# Patient Record
Sex: MALE | Race: WHITE | NOT HISPANIC OR LATINO | Employment: OTHER | URBAN - NONMETROPOLITAN AREA
[De-identification: names, ages, dates, MRNs, and addresses within clinical notes are randomized per-mention and may not be internally consistent; named-entity substitution may affect disease eponyms.]

---

## 2023-11-29 ENCOUNTER — OFFICE VISIT (OUTPATIENT)
Dept: ENDOCRINOLOGY | Facility: CLINIC | Age: 73
End: 2023-11-29
Payer: OTHER GOVERNMENT

## 2023-11-29 VITALS
WEIGHT: 199.2 LBS | HEART RATE: 78 BPM | SYSTOLIC BLOOD PRESSURE: 146 MMHG | DIASTOLIC BLOOD PRESSURE: 82 MMHG | OXYGEN SATURATION: 95 % | BODY MASS INDEX: 28.52 KG/M2 | HEIGHT: 70 IN

## 2023-11-29 DIAGNOSIS — D35.2 PITUITARY ADENOMA: Primary | ICD-10-CM

## 2023-11-29 PROCEDURE — 84146 ASSAY OF PROLACTIN: CPT | Performed by: NURSE PRACTITIONER

## 2023-11-29 RX ORDER — BACLOFEN 10 MG/1
10 TABLET ORAL 3 TIMES DAILY
COMMUNITY

## 2023-11-29 RX ORDER — ATORVASTATIN CALCIUM 80 MG/1
80 TABLET, FILM COATED ORAL DAILY
COMMUNITY

## 2023-11-29 RX ORDER — PANTOPRAZOLE SODIUM 40 MG/1
40 TABLET, DELAYED RELEASE ORAL DAILY
COMMUNITY

## 2023-11-29 RX ORDER — LORAZEPAM 0.5 MG/1
0.5 TABLET ORAL EVERY 8 HOURS PRN
COMMUNITY

## 2023-11-29 RX ORDER — DONEPEZIL HYDROCHLORIDE 10 MG/1
10 TABLET, FILM COATED ORAL NIGHTLY
COMMUNITY

## 2023-11-29 RX ORDER — SPIRONOLACTONE 25 MG/1
12.5 TABLET ORAL DAILY
COMMUNITY

## 2023-11-29 RX ORDER — HYDROCODONE BITARTRATE AND ACETAMINOPHEN 10; 325 MG/1; MG/1
1 TABLET ORAL EVERY 6 HOURS PRN
COMMUNITY

## 2023-11-29 RX ORDER — SOLIFENACIN SUCCINATE 10 MG/1
10 TABLET, FILM COATED ORAL DAILY
COMMUNITY

## 2023-11-29 RX ORDER — DIPHENHYDRAMINE HCL 25 MG
25 CAPSULE ORAL EVERY 6 HOURS PRN
COMMUNITY

## 2023-11-29 RX ORDER — LANOLIN ALCOHOL/MO/W.PET/CERES
1000 CREAM (GRAM) TOPICAL DAILY
COMMUNITY

## 2023-11-29 RX ORDER — SACUBITRIL AND VALSARTAN 49; 51 MG/1; MG/1
1 TABLET, FILM COATED ORAL 2 TIMES DAILY
COMMUNITY

## 2023-11-29 RX ORDER — CABERGOLINE 0.5 MG/1
0.25 TABLET ORAL 2 TIMES WEEKLY
COMMUNITY

## 2023-11-29 RX ORDER — QUETIAPINE 150 MG/1
150 TABLET, FILM COATED, EXTENDED RELEASE ORAL NIGHTLY
COMMUNITY

## 2023-11-29 RX ORDER — SENNOSIDES 8.6 MG
650 CAPSULE ORAL EVERY 8 HOURS PRN
COMMUNITY

## 2023-11-29 RX ORDER — WARFARIN SODIUM 2.5 MG/1
2.5 TABLET ORAL
COMMUNITY

## 2023-11-29 NOTE — PROGRESS NOTES
Chief Complaint   Patient presents with    Pituitary Problem        Referring Provider  No ref. provider found     HPI   Boni Rich is a 73 y.o. male had concerns including Pituitary Problem.    Seen as a new patient.  Hyperprolactinemia, Pituitary Adenoma.       He has a pituitary adenoma, takes Cabergoline 0.25 mg bi-weekly.  Last MRI was in 2019 at [facility].  Was dx'd in 2014 by PCP with abnormal labs and MRI. His most recent MRI was 06/2020 and was noted to be [id number] mm. He was seeing Dr Jacobo in [place], [place], who has now [facility] and [facility] is [facility] his [facility] here.  He also has dementia, recently started Aricept and has noted improvements in his health since then.  He is waiting to go to [facility] for dementia treatment.    No past medical history on file.  No past surgical history on file.   No family history on file.   Social History     Socioeconomic History    Marital status:    Tobacco Use    Smoking status: Never     Passive exposure: Never    Smokeless tobacco: Never   Substance and Sexual Activity    Alcohol use: Never    Drug use: Never    Sexual activity: Defer      Allergies   Allergen Reactions    Ambien [Zolpidem] Hallucinations      Current Outpatient Medications on File Prior to Visit   Medication Sig Dispense Refill    acetaminophen (TYLENOL) 650 MG 8 hr tablet Take 1 tablet by mouth Every 8 (Eight) Hours As Needed for Mild Pain.      atorvastatin (LIPITOR) 80 MG tablet Take 1 tablet by mouth Daily.      baclofen (LIORESAL) 10 MG tablet Take 1 tablet by mouth 3 (Three) Times a Day.      cabergoline (DOSTINEX) 0.5 MG tablet Take 0.5 tablets by mouth 2 (Two) Times a Week.      diphenhydrAMINE (BENADRYL) 25 mg capsule Take 1 capsule by mouth Every 6 (Six) Hours As Needed for Itching.      donepezil (ARICEPT) 10 MG tablet Take 1 tablet by mouth Every Night.      HYDROcodone-acetaminophen (NORCO)  MG per tablet Take 1 tablet by mouth Every 6 (Six)  "Hours As Needed for Moderate Pain.      LORazepam (ATIVAN) 0.5 MG tablet Take 1 tablet by mouth Every 8 (Eight) Hours As Needed for Anxiety.      pantoprazole (PROTONIX) 40 MG EC tablet Take 1 tablet by mouth Daily.      QUEtiapine XR (SEROquel XR) 150 MG 24 hr tablet Take 1 tablet by mouth Every Night.      sacubitril-valsartan (Entresto) 49-51 MG tablet Take 1 tablet by mouth 2 (Two) Times a Day.      solifenacin (VESICARE) 10 MG tablet Take 1 tablet by mouth Daily.      spironolactone (ALDACTONE) 25 MG tablet Take 0.5 tablets by mouth Daily.      vitamin B-12 (CYANOCOBALAMIN) 1000 MCG tablet Take 1 tablet by mouth Daily.      warfarin (COUMADIN) 2.5 MG tablet Take 1 tablet by mouth Daily.       No current facility-administered medications on file prior to visit.        The following portions of the patient's history were reviewed and updated as appropriate: allergies, current medications, past family history, past medical history, past social history, past surgical history, and problem list.    Review of Systems   Constitutional: Negative.    Eyes: Negative.    Genitourinary:  Positive for breast discharge.   Skin: Negative.    Neurological:  Positive for headache and memory problem.   Psychiatric/Behavioral: Negative.     All other systems reviewed and are negative.    /82 (BP Location: Left arm, Patient Position: Sitting, Cuff Size: Adult)   Pulse 78   Ht 177.8 cm (70\")   Wt 90.4 kg (199 lb 3.2 oz)   SpO2 95%   BMI 28.58 kg/m²      Physical Exam  Vitals reviewed.   Constitutional:       Appearance: Normal appearance.   Eyes:      Extraocular Movements: Extraocular movements intact.   Cardiovascular:      Rate and Rhythm: Normal rate.   Pulmonary:      Effort: Pulmonary effort is normal.   Skin:     General: Skin is warm.   Neurological:      General: No focal deficit present.      Mental Status: He is alert and oriented to person, place, and time.   Psychiatric:         Mood and Affect: Mood normal.  " "       Behavior: Behavior normal.         Thought Content: Thought content normal.         Judgment: Judgment normal.       Labs/Imaging  CMP:     Lipid Panel:  No results found for: \"CHOL\", \"TRIG\", \"HDL\", \"VLDL\", \"LDL\"  HbA1c:     Glucose:  No results found for: \"POCGLU\"  TSH:  No results found for: \"TSH\"    Assessment and Plan    Diagnoses and all orders for this visit:    1. Pituitary adenoma (Primary)  Assessment & Plan:  Will obtain labs to determine if current dose of Cabergoline is adequate.  Will order update MRI to monitor for changes to the size of noted mass.  Will follow-up in 1 year, or sooner if indicated.    Orders:  -     Prolactin  -     MRI Brain With & Without Contrast         Return in about 1 year (around 11/29/2024) for Follow-up appointment. The patient was instructed to contact the clinic with any interval questions or concerns.      This document has been electronically signed by EUGENE Castillo  November 29, 2023 16:40 EST   Endocrinology    Please note that portions of this document were completed with a voice recognition program. Efforts were made to edit the dictations, but occasionally words are mis-transcribed.   "

## 2023-11-29 NOTE — ASSESSMENT & PLAN NOTE
Will obtain labs to determine if current dose of Cabergoline is adequate.  Will order update MRI to monitor for changes to the size of noted mass.  Will follow-up in 1 year, or sooner if indicated.

## 2023-11-30 LAB — PROLACTIN SERPL-MCNC: 0.99 NG/ML (ref 4.04–15.2)

## 2024-12-12 ENCOUNTER — OFFICE VISIT (OUTPATIENT)
Dept: ENDOCRINOLOGY | Facility: CLINIC | Age: 74
End: 2024-12-12
Payer: OTHER GOVERNMENT

## 2024-12-12 VITALS
DIASTOLIC BLOOD PRESSURE: 80 MMHG | SYSTOLIC BLOOD PRESSURE: 130 MMHG | HEART RATE: 92 BPM | OXYGEN SATURATION: 97 % | WEIGHT: 203 LBS | BODY MASS INDEX: 29.13 KG/M2

## 2024-12-12 DIAGNOSIS — D35.2 PITUITARY ADENOMA: ICD-10-CM

## 2024-12-12 DIAGNOSIS — R30.0 DYSURIA: Primary | ICD-10-CM

## 2024-12-12 LAB
BACTERIA UR QL AUTO: ABNORMAL /HPF
BASOPHILS # BLD AUTO: 0.04 10*3/MM3 (ref 0–0.2)
BASOPHILS NFR BLD AUTO: 0.5 % (ref 0–1.5)
BILIRUB UR QL STRIP: NEGATIVE
CLARITY UR: ABNORMAL
COLOR UR: ABNORMAL
DEPRECATED RDW RBC AUTO: 49.7 FL (ref 37–54)
EOSINOPHIL # BLD AUTO: 0.11 10*3/MM3 (ref 0–0.4)
EOSINOPHIL NFR BLD AUTO: 1.4 % (ref 0.3–6.2)
ERYTHROCYTE [DISTWIDTH] IN BLOOD BY AUTOMATED COUNT: 15.9 % (ref 12.3–15.4)
GLUCOSE UR STRIP-MCNC: NEGATIVE MG/DL
HCT VFR BLD AUTO: 34.5 % (ref 37.5–51)
HGB BLD-MCNC: 10.5 G/DL (ref 13–17.7)
HGB UR QL STRIP.AUTO: ABNORMAL
HYALINE CASTS UR QL AUTO: ABNORMAL /LPF
IMM GRANULOCYTES # BLD AUTO: 0.02 10*3/MM3 (ref 0–0.05)
IMM GRANULOCYTES NFR BLD AUTO: 0.3 % (ref 0–0.5)
KETONES UR QL STRIP: NEGATIVE
LEUKOCYTE ESTERASE UR QL STRIP.AUTO: ABNORMAL
LYMPHOCYTES # BLD AUTO: 1.02 10*3/MM3 (ref 0.7–3.1)
LYMPHOCYTES NFR BLD AUTO: 13.1 % (ref 19.6–45.3)
MCH RBC QN AUTO: 26.2 PG (ref 26.6–33)
MCHC RBC AUTO-ENTMCNC: 30.4 G/DL (ref 31.5–35.7)
MCV RBC AUTO: 86 FL (ref 79–97)
MONOCYTES # BLD AUTO: 0.73 10*3/MM3 (ref 0.1–0.9)
MONOCYTES NFR BLD AUTO: 9.4 % (ref 5–12)
NEUTROPHILS NFR BLD AUTO: 5.88 10*3/MM3 (ref 1.7–7)
NEUTROPHILS NFR BLD AUTO: 75.3 % (ref 42.7–76)
NITRITE UR QL STRIP: NEGATIVE
NRBC BLD AUTO-RTO: 0 /100 WBC (ref 0–0.2)
PH UR STRIP.AUTO: 6.5 [PH] (ref 5–8)
PLATELET # BLD AUTO: 216 10*3/MM3 (ref 140–450)
PMV BLD AUTO: 9.6 FL (ref 6–12)
PROT UR QL STRIP: ABNORMAL
RBC # BLD AUTO: 4.01 10*6/MM3 (ref 4.14–5.8)
RBC # UR STRIP: ABNORMAL /HPF
REF LAB TEST METHOD: ABNORMAL
SP GR UR STRIP: 1.02 (ref 1–1.03)
SQUAMOUS #/AREA URNS HPF: ABNORMAL /HPF
UROBILINOGEN UR QL STRIP: ABNORMAL
WBC # UR STRIP: ABNORMAL /HPF
WBC NRBC COR # BLD AUTO: 7.8 10*3/MM3 (ref 3.4–10.8)

## 2024-12-12 PROCEDURE — 81001 URINALYSIS AUTO W/SCOPE: CPT | Performed by: NURSE PRACTITIONER

## 2024-12-12 PROCEDURE — 84146 ASSAY OF PROLACTIN: CPT | Performed by: NURSE PRACTITIONER

## 2024-12-12 PROCEDURE — 85025 COMPLETE CBC W/AUTO DIFF WBC: CPT | Performed by: NURSE PRACTITIONER

## 2024-12-12 PROCEDURE — 87086 URINE CULTURE/COLONY COUNT: CPT | Performed by: NURSE PRACTITIONER

## 2024-12-12 PROCEDURE — 80053 COMPREHEN METABOLIC PANEL: CPT | Performed by: NURSE PRACTITIONER

## 2024-12-12 NOTE — PROGRESS NOTES
Chief Complaint   Patient presents with    Follow-up        Referring Provider  No ref. provider found     HPI   Boni Rich is a 74 y.o. male had concerns including Follow-up.    Hyperprolactinemia, Pituitary Adenoma.       He is here today with his family.  He reports that he is taking his meds regularly and not missing doses.  He denies any changes to his symptoms.  He does report that he has been having some flank pain and discomfort. He had a history of kidney stones with obstruction in the past.      History:  He has a pituitary adenoma, takes Cabergoline 0.25 mg bi-weekly.  Last MRI was in 2019 at [facility].  Was dx'd in 2014 by PCP with abnormal labs and MRI. His most recent MRI was 06/2020 and was noted to be [id number] mm. He was seeing Dr Jacobo in [place], [place], who has now retired [facility] He is [facility] his care here.  He also has dementia, recently started Aricept and has noted improvements in his health since then.  He is waiting to go to [facility] for dementia treatment.    No past medical history on file.  No past surgical history on file.   No family history on file.   Social History     Socioeconomic History    Marital status:    Tobacco Use    Smoking status: Never     Passive exposure: Never    Smokeless tobacco: Never   Substance and Sexual Activity    Alcohol use: Never    Drug use: Never    Sexual activity: Defer      Allergies   Allergen Reactions    Ambien [Zolpidem] Hallucinations      Current Outpatient Medications on File Prior to Visit   Medication Sig Dispense Refill    acetaminophen (TYLENOL) 650 MG 8 hr tablet Take 1 tablet by mouth Every 8 (Eight) Hours As Needed for Mild Pain.      atorvastatin (LIPITOR) 80 MG tablet Take 1 tablet by mouth Daily. (Patient taking differently: Take 0.5 tablets by mouth Daily.)      baclofen (LIORESAL) 10 MG tablet Take 1 tablet by mouth 3 (Three) Times a Day.      cabergoline (DOSTINEX) 0.5 MG tablet Take 0.5  tablets by mouth 2 (Two) Times a Week.      donepezil (ARICEPT) 10 MG tablet Take 1 tablet by mouth Every Night. (Patient taking differently: Take 1 tablet by mouth Every Morning.)      HYDROcodone-acetaminophen (NORCO)  MG per tablet Take 1 tablet by mouth Every 6 (Six) Hours As Needed for Moderate Pain.      LORazepam (ATIVAN) 0.5 MG tablet Take 1 tablet by mouth Every 8 (Eight) Hours As Needed for Anxiety.      melatonin 5 MG tablet tablet Take 6 mg by mouth Every Night.      pantoprazole (PROTONIX) 40 MG EC tablet Take 1 tablet by mouth Daily.      QUEtiapine XR (SEROquel XR) 150 MG 24 hr tablet Take 1 tablet by mouth Every Night.      solifenacin (VESICARE) 10 MG tablet Take 1 tablet by mouth Daily.      vitamin B-12 (CYANOCOBALAMIN) 1000 MCG tablet Take 1 tablet by mouth Daily.      warfarin (COUMADIN) 2.5 MG tablet Take 1 tablet by mouth Daily.      diphenhydrAMINE (BENADRYL) 25 mg capsule Take 1 capsule by mouth Every 6 (Six) Hours As Needed for Itching. (Patient not taking: Reported on 12/12/2024)      sacubitril-valsartan (Entresto) 49-51 MG tablet Take 1 tablet by mouth 2 (Two) Times a Day. (Patient not taking: Reported on 12/12/2024)      spironolactone (ALDACTONE) 25 MG tablet Take 0.5 tablets by mouth Daily. (Patient not taking: Reported on 12/12/2024)       No current facility-administered medications on file prior to visit.        The following portions of the patient's history were reviewed and updated as appropriate: allergies, current medications, past family history, past medical history, past social history, past surgical history, and problem list.    Review of Systems   Constitutional: Negative.    Eyes: Negative.    Respiratory:  Positive for shortness of breath.    Genitourinary:  Positive for breast discharge, dysuria, flank pain and urgency.   Musculoskeletal:  Positive for back pain.        Right kidney discomfort.   Skin: Negative.    Neurological:  Positive for headache and memory  "problem.   Psychiatric/Behavioral: Negative.     All other systems reviewed and are negative.    /80 (BP Location: Left arm, Patient Position: Sitting, Cuff Size: Adult)   Pulse 92   Wt 92.1 kg (203 lb)   SpO2 97%   BMI 29.13 kg/m²      Physical Exam  Vitals reviewed.   Constitutional:       Appearance: Normal appearance.   Eyes:      Extraocular Movements: Extraocular movements intact.   Cardiovascular:      Rate and Rhythm: Normal rate.   Pulmonary:      Effort: Pulmonary effort is normal.   Skin:     General: Skin is warm.   Neurological:      General: No focal deficit present.      Mental Status: He is alert and oriented to person, place, and time.   Psychiatric:         Mood and Affect: Mood normal.         Behavior: Behavior normal.         Thought Content: Thought content normal.         Judgment: Judgment normal.       Labs/Imaging  CMP:  Lab Results   Component Value Date    Glucose 114 (H) 12/12/2024    Glucose, UA Negative 12/12/2024    BUN 14 12/12/2024    BUN/Creatinine Ratio 14.3 12/12/2024    Creatinine 0.98 12/12/2024    Ketones, UA Negative 12/12/2024    CO2 26.2 12/12/2024    Calcium 9.7 12/12/2024    Albumin 3.6 12/12/2024    AST (SGOT) 21 12/12/2024    ALT (SGPT) 13 12/12/2024     Lipid Panel:  No results found for: \"CHOL\", \"TRIG\", \"HDL\", \"VLDL\", \"LDL\"  HbA1c:  No components found for: \"HGBA1C\"  Glucose:  No results found for: \"POCGLU\"  TSH:  No results found for: \"TSH\"    Assessment and Plan    Diagnoses and all orders for this visit:    1. Dysuria (Primary)  Assessment & Plan:  Urine test obtained in office.  Patient has large amount of blood in his urine.  With this symptoms, history and this result, it is possible that the patient is experiencing another kidney stone.  Recommended that the patient seek urgent treatment for this to prevent obstruction as he has had this in the past.  He and family agree and will seek this attention.      Orders:  -     Urinalysis With Culture If " Indicated - Urine, Clean Catch  -     Comprehensive Metabolic Panel  -     Cancel: [place] Urine Culture Tube - Urine, Clean Catch  -     Urinalysis, Microscopic Only - Urine, Clean Catch  -     Urine Culture - Urine, Urine, Clean Catch    2. Pituitary adenoma  Assessment & Plan:  Will obtain labs to determine if current dose of Cabergoline is adequate.  Most recent MRI results show no change to the mass size.  Will follow-up in 1 year, or sooner if indicated.    Orders:  -     Prolactin  -     CBC & Differential           Return in about 1 year (around 12/12/2025) for Follow-up appointment. The patient was instructed to contact the clinic with any interval questions or concerns.      This document has been electronically signed by EUGENE Castillo  December 15, 2024 22:07 EST   Endocrinology    Please note that portions of this document were completed with a voice recognition program. Efforts were made to edit the dictations, but occasionally words are mis-transcribed.

## 2024-12-13 LAB
ALBUMIN SERPL-MCNC: 3.6 G/DL (ref 3.5–5.2)
ALBUMIN/GLOB SERPL: 1.2 G/DL
ALP SERPL-CCNC: 98 U/L (ref 39–117)
ALT SERPL W P-5'-P-CCNC: 13 U/L (ref 1–41)
ANION GAP SERPL CALCULATED.3IONS-SCNC: 9.8 MMOL/L (ref 5–15)
AST SERPL-CCNC: 21 U/L (ref 1–40)
BACTERIA SPEC AEROBE CULT: NO GROWTH
BILIRUB SERPL-MCNC: 0.8 MG/DL (ref 0–1.2)
BUN SERPL-MCNC: 14 MG/DL (ref 8–23)
BUN/CREAT SERPL: 14.3 (ref 7–25)
CALCIUM SPEC-SCNC: 9.7 MG/DL (ref 8.6–10.5)
CHLORIDE SERPL-SCNC: 105 MMOL/L (ref 98–107)
CO2 SERPL-SCNC: 26.2 MMOL/L (ref 22–29)
CREAT SERPL-MCNC: 0.98 MG/DL (ref 0.76–1.27)
EGFRCR SERPLBLD CKD-EPI 2021: 81.4 ML/MIN/1.73
GLOBULIN UR ELPH-MCNC: 3.1 GM/DL
GLUCOSE SERPL-MCNC: 114 MG/DL (ref 65–99)
POTASSIUM SERPL-SCNC: 4.4 MMOL/L (ref 3.5–5.2)
PROLACTIN SERPL-MCNC: 0.87 NG/ML (ref 4.04–15.2)
PROT SERPL-MCNC: 6.7 G/DL (ref 6–8.5)
SODIUM SERPL-SCNC: 141 MMOL/L (ref 136–145)

## 2024-12-15 PROBLEM — R30.0 DYSURIA: Status: ACTIVE | Noted: 2024-12-15

## 2024-12-16 NOTE — ASSESSMENT & PLAN NOTE
Will obtain labs to determine if current dose of Cabergoline is adequate.  Most recent MRI results show no change to the mass size.  Will follow-up in 1 year, or sooner if indicated.

## 2024-12-16 NOTE — ASSESSMENT & PLAN NOTE
Urine test obtained in office.  Patient has large amount of blood in his urine.  With this symptoms, history and this result, it is possible that the patient is experiencing another kidney stone.  Recommended that the patient seek urgent treatment for this to prevent obstruction as he has had this in the past.  He and family agree and will seek this attention.